# Patient Record
Sex: MALE | Race: WHITE | NOT HISPANIC OR LATINO
[De-identification: names, ages, dates, MRNs, and addresses within clinical notes are randomized per-mention and may not be internally consistent; named-entity substitution may affect disease eponyms.]

---

## 2023-03-23 ENCOUNTER — P2P PATIENT RECORD (OUTPATIENT)
Age: 68
End: 2023-03-23

## 2023-03-28 ENCOUNTER — OFFICE VISIT (OUTPATIENT)
Dept: URBAN - METROPOLITAN AREA TELEHEALTH 2 | Facility: TELEHEALTH | Age: 68
End: 2023-03-28
Payer: MEDICARE

## 2023-03-28 ENCOUNTER — OFFICE VISIT (OUTPATIENT)
Dept: URBAN - METROPOLITAN AREA TELEHEALTH 2 | Facility: TELEHEALTH | Age: 68
End: 2023-03-28

## 2023-03-28 DIAGNOSIS — K76.9 LIVER LESION: ICD-10-CM

## 2023-03-28 DIAGNOSIS — B18.2 CHRONIC HEPATITIS C WITHOUT HEPATIC COMA: ICD-10-CM

## 2023-03-28 PROBLEM — 300331000: Status: ACTIVE | Noted: 2023-03-28

## 2023-03-28 PROBLEM — 416200006: Status: ACTIVE | Noted: 2023-03-28

## 2023-03-28 PROBLEM — 453861000124107: Status: ACTIVE | Noted: 2023-03-28

## 2023-03-28 PROBLEM — 128302006: Status: ACTIVE | Noted: 2023-03-28

## 2023-03-28 PROCEDURE — 99204 OFFICE O/P NEW MOD 45 MIN: CPT | Performed by: PHYSICIAN ASSISTANT

## 2023-03-28 RX ORDER — AMLODIPINE BESYLATE 10 MG/1
1 TABLET TABLET ORAL ONCE A DAY
Status: ACTIVE | COMMUNITY

## 2023-03-28 RX ORDER — TAMSULOSIN HYDROCHLORIDE 0.4 MG/1
1 CAPSULE CAPSULE ORAL ONCE A DAY
Status: ACTIVE | COMMUNITY

## 2023-03-28 RX ORDER — OMEPRAZOLE 40 MG/1
1 CAPSULE 30 MINUTES BEFORE MORNING MEAL CAPSULE, DELAYED RELEASE ORAL ONCE A DAY
Status: ACTIVE | COMMUNITY

## 2023-03-28 NOTE — HPI-TODAY'S VISIT:
This is a 67 year old male referred by Dr. Atul Tavera for evaluation of liver lesion and hepatitis c.   3/28/23 Only some records sent to us and patient was on the way to another doctors visit that he forgot about therefore phone call was used.  PatientIs listed it is taking amlodipine 10 mg, diclofenac 1% topical gel, omeprazole 40 mg, tamsulosin 0.4 mg.  He is listed as having chronic hepatitis C, hypertension, atrial fibrillation, hiatal hernia, BPH, history of Tb. He notes hx of illicit drug use. Denies blood tranfusion. Some etoh use. Discussed meds and cautioned on diclofenac. ALso discussed the omeprazole and would need to lower that dose  MRI dated March 6, 2023 Liver without fat or iron.  Heterogeneous lacelike enhancement of the liver noted on most series and suggests fibrosis.  Lesion #1 on segment 5 with arterial phase enhancement they thought was LR 3.  They also saw scattered peripheral foci of arterial hypervascularity noted and they thought these might represent perfusion abnormalities.  Gallbladder and biliary tree normal.  The kidneys with severe hydronephrosis and hydroureter involving the left kidney.  Gastrointestinal large hiatal hernia.  Spleen normal.  Pancreas normal.  Overall they thought they saw changes of chronic liver disease and an LR 3 lesion. He is seeing a urologist to take a look at the kidney issues.  Hepatitis C genotype is listed as 1 day and there is a drug resistance assay that is pending.  This was done through [facility].  He had ultrasound done in 2021 due to chronic hep C it showed the liver had normal echogenicity and there were no lesions in the portal vein was patent and the hepatic veins are patent.  Discussed the labs and he has had knowledge of this for a while but did not want to do the older treatments.

## 2023-05-12 ENCOUNTER — OFFICE VISIT (OUTPATIENT)
Dept: URBAN - METROPOLITAN AREA CLINIC 86 | Facility: CLINIC | Age: 68
End: 2023-05-12

## 2023-05-23 ENCOUNTER — WEB ENCOUNTER (OUTPATIENT)
Dept: URBAN - METROPOLITAN AREA CLINIC 42 | Facility: CLINIC | Age: 68
End: 2023-05-23

## 2023-05-26 ENCOUNTER — WEB ENCOUNTER (OUTPATIENT)
Dept: URBAN - METROPOLITAN AREA CLINIC 42 | Facility: CLINIC | Age: 68
End: 2023-05-26

## 2023-05-26 ENCOUNTER — OFFICE VISIT (OUTPATIENT)
Dept: URBAN - METROPOLITAN AREA CLINIC 42 | Facility: CLINIC | Age: 68
End: 2023-05-26
Payer: MEDICARE

## 2023-05-26 VITALS
BODY MASS INDEX: 25.62 KG/M2 | HEART RATE: 84 BPM | SYSTOLIC BLOOD PRESSURE: 131 MMHG | TEMPERATURE: 98.4 F | RESPIRATION RATE: 20 BRPM | WEIGHT: 183 LBS | HEIGHT: 71 IN | DIASTOLIC BLOOD PRESSURE: 81 MMHG

## 2023-05-26 DIAGNOSIS — K74.60 CIRRHOSIS OF LIVER WITHOUT ASCITES, UNSPECIFIED HEPATIC CIRRHOSIS TYPE: ICD-10-CM

## 2023-05-26 DIAGNOSIS — R10.32 LLQ PAIN: ICD-10-CM

## 2023-05-26 DIAGNOSIS — B18.2 CHRONIC HEPATITIS C WITHOUT HEPATIC COMA: ICD-10-CM

## 2023-05-26 DIAGNOSIS — K76.9 LIVER LESION: ICD-10-CM

## 2023-05-26 PROBLEM — 19943007: Status: ACTIVE | Noted: 2023-05-26

## 2023-05-26 PROCEDURE — 99214 OFFICE O/P EST MOD 30 MIN: CPT | Performed by: INTERNAL MEDICINE

## 2023-05-26 RX ORDER — AMLODIPINE BESYLATE 10 MG/1
1 TABLET TABLET ORAL ONCE A DAY
Status: ACTIVE | COMMUNITY

## 2023-05-26 RX ORDER — OMEPRAZOLE 40 MG/1
1 CAPSULE 30 MINUTES BEFORE MORNING MEAL CAPSULE, DELAYED RELEASE ORAL ONCE A DAY
Status: ACTIVE | COMMUNITY

## 2023-05-26 RX ORDER — DICYCLOMINE HYDROCHLORIDE 20 MG/1
1 TABLET TABLET ORAL
Qty: 120 | Refills: 3 | OUTPATIENT
Start: 2023-05-26 | End: 2023-09-23

## 2023-05-26 RX ORDER — TAMSULOSIN HYDROCHLORIDE 0.4 MG/1
1 CAPSULE CAPSULE ORAL ONCE A DAY
Status: ACTIVE | COMMUNITY

## 2023-05-26 NOTE — HPI-TODAY'S VISIT:
The patient is a 67 yo with chronic hepatitis C cirrhosis who is here for a follow-up.  Does have chronic hepatitis C and has never been on treatment.  Recent MRI showed LR3 liver lesion.  Has not had any follow-up with that now is following up with me for the first time.  He also complains of some achy abdominal pain that is in the LLQ that radiates to the R and epigastric area.  No real changes in bowel habits except some mild decrease frequently.  does take a shake with a lot of greens.

## 2023-05-26 NOTE — PHYSICAL EXAM EYES:
Conjunctivae and eyelids appear normal,  Sclerae : White without injection  Form of the steroid changed from foam to solution.  Saloni Turcios, CHANDLER, CNP, FNP-BC  [facility]

## 2023-06-05 ENCOUNTER — LAB OUTSIDE AN ENCOUNTER (OUTPATIENT)
Dept: URBAN - METROPOLITAN AREA TELEHEALTH 2 | Facility: TELEHEALTH | Age: 68
End: 2023-06-05

## 2023-06-23 ENCOUNTER — WEB ENCOUNTER (OUTPATIENT)
Dept: URBAN - METROPOLITAN AREA CLINIC 42 | Facility: CLINIC | Age: 68
End: 2023-06-23

## 2023-07-26 ENCOUNTER — OFFICE VISIT (OUTPATIENT)
Dept: URBAN - METROPOLITAN AREA CLINIC 42 | Facility: CLINIC | Age: 68
End: 2023-07-26
Payer: MEDICARE

## 2023-07-26 VITALS
SYSTOLIC BLOOD PRESSURE: 144 MMHG | HEIGHT: 71 IN | DIASTOLIC BLOOD PRESSURE: 75 MMHG | WEIGHT: 182 LBS | RESPIRATION RATE: 20 BRPM | BODY MASS INDEX: 25.48 KG/M2 | TEMPERATURE: 98 F | HEART RATE: 91 BPM

## 2023-07-26 DIAGNOSIS — K76.9 LIVER LESION: ICD-10-CM

## 2023-07-26 DIAGNOSIS — R10.32 LLQ PAIN: ICD-10-CM

## 2023-07-26 DIAGNOSIS — K74.60 CIRRHOSIS OF LIVER WITHOUT ASCITES, UNSPECIFIED HEPATIC CIRRHOSIS TYPE: ICD-10-CM

## 2023-07-26 DIAGNOSIS — B18.2 CHRONIC HEPATITIS C WITHOUT HEPATIC COMA: ICD-10-CM

## 2023-07-26 PROCEDURE — 99214 OFFICE O/P EST MOD 30 MIN: CPT | Performed by: INTERNAL MEDICINE

## 2023-07-26 RX ORDER — AMLODIPINE BESYLATE 10 MG/1
1 TABLET TABLET ORAL ONCE A DAY
Status: ACTIVE | COMMUNITY

## 2023-07-26 RX ORDER — DICYCLOMINE HYDROCHLORIDE 20 MG/1
1 TABLET TABLET ORAL
Qty: 120 | Refills: 3 | OUTPATIENT

## 2023-07-26 RX ORDER — OMEPRAZOLE 40 MG/1
1 CAPSULE 30 MINUTES BEFORE MORNING MEAL CAPSULE, DELAYED RELEASE ORAL ONCE A DAY
Status: ACTIVE | COMMUNITY

## 2023-07-26 RX ORDER — TAMSULOSIN HYDROCHLORIDE 0.4 MG/1
1 CAPSULE CAPSULE ORAL ONCE A DAY
Status: ACTIVE | COMMUNITY

## 2023-07-26 RX ORDER — DICYCLOMINE HYDROCHLORIDE 20 MG/1
1 TABLET TABLET ORAL
Qty: 120 | Refills: 3 | Status: ACTIVE | COMMUNITY
Start: 2023-05-26 | End: 2023-09-23

## 2023-07-26 NOTE — PHYSICAL EXAM CONSTITUTIONAL:
well developed, well nourished , in no acute distress , ambulating without difficulty , normal communication ability
[place]

## 2023-07-26 NOTE — HPI-TODAY'S VISIT:
The patient is a 67 yo with chronic hepatitis C cirrhosis who is here for a follow-up.  Does have chronic hepatitis C and has never been on treatment.  Recent MRI showed LR3 liver lesion.  Has not had any follow-up with that now is following up with me for the first time.  He also complains of some achy abdominal pain that is in the LLQ that radiates to the R and epigastric area.  No real changes in bowel habits except some mild decrease frequently.  does take a shake with a lot of greens.  7/26/2023 Since last visit, has been seen by Dr. Mariposa Kaur and planned for repeat MRI for evaluation of the liver lesion and no HCV treatment was recommended.  melida continues to feel fine.

## 2023-07-28 ENCOUNTER — WEB ENCOUNTER (OUTPATIENT)
Dept: URBAN - METROPOLITAN AREA CLINIC 42 | Facility: CLINIC | Age: 68
End: 2023-07-28

## 2023-08-07 ENCOUNTER — WEB ENCOUNTER (OUTPATIENT)
Dept: URBAN - METROPOLITAN AREA CLINIC 42 | Facility: CLINIC | Age: 68
End: 2023-08-07

## 2023-08-09 ENCOUNTER — TELEPHONE ENCOUNTER (OUTPATIENT)
Dept: URBAN - METROPOLITAN AREA CLINIC 23 | Facility: CLINIC | Age: 68
End: 2023-08-09

## 2023-09-05 ENCOUNTER — WEB ENCOUNTER (OUTPATIENT)
Dept: URBAN - METROPOLITAN AREA CLINIC 42 | Facility: CLINIC | Age: 68
End: 2023-09-05

## 2023-09-27 ENCOUNTER — OFFICE VISIT (OUTPATIENT)
Dept: URBAN - METROPOLITAN AREA CLINIC 42 | Facility: CLINIC | Age: 68
End: 2023-09-27

## 2023-11-03 ENCOUNTER — OFFICE VISIT (OUTPATIENT)
Dept: URBAN - METROPOLITAN AREA CLINIC 42 | Facility: CLINIC | Age: 68
End: 2023-11-03
Payer: MEDICARE

## 2023-11-03 VITALS
HEART RATE: 84 BPM | BODY MASS INDEX: 25.62 KG/M2 | SYSTOLIC BLOOD PRESSURE: 127 MMHG | TEMPERATURE: 97.7 F | DIASTOLIC BLOOD PRESSURE: 86 MMHG | RESPIRATION RATE: 20 BRPM | HEIGHT: 71 IN | WEIGHT: 183 LBS

## 2023-11-03 DIAGNOSIS — B18.2 CHRONIC HEPATITIS C WITHOUT HEPATIC COMA: ICD-10-CM

## 2023-11-03 DIAGNOSIS — K76.9 LIVER LESION: ICD-10-CM

## 2023-11-03 DIAGNOSIS — K74.60 CIRRHOSIS OF LIVER WITHOUT ASCITES, UNSPECIFIED HEPATIC CIRRHOSIS TYPE: ICD-10-CM

## 2023-11-03 PROCEDURE — 99214 OFFICE O/P EST MOD 30 MIN: CPT | Performed by: INTERNAL MEDICINE

## 2023-11-03 RX ORDER — VELPATASVIR AND SOFOSBUVIR 100; 400 MG/1; MG/1
1 TABLET TABLET, FILM COATED ORAL ONCE A DAY
Qty: 84 TABLET | Refills: 0 | OUTPATIENT
Start: 2023-11-03 | End: 2024-01-25

## 2023-11-03 RX ORDER — OMEPRAZOLE 40 MG/1
1 CAPSULE 30 MINUTES BEFORE MORNING MEAL CAPSULE, DELAYED RELEASE ORAL AS NEEDED
Status: ACTIVE | COMMUNITY

## 2023-11-03 RX ORDER — DICYCLOMINE HYDROCHLORIDE 20 MG/1
1 TABLET TABLET ORAL
Qty: 120 | Refills: 3 | Status: ACTIVE | COMMUNITY

## 2023-11-03 RX ORDER — AMLODIPINE BESYLATE 10 MG/1
1 TABLET TABLET ORAL ONCE A DAY
Status: ACTIVE | COMMUNITY

## 2023-11-03 NOTE — HPI-TODAY'S VISIT:
The patient is a 67 yo with chronic hepatitis C cirrhosis who is here for a follow-up.  Does have chronic hepatitis C and has never been on treatment.  Recent MRI showed LR3 liver lesion.  Has not had any follow-up with that now is following up with me for the first time.  He also complains of some achy abdominal pain that is in the LLQ that radiates to the R and epigastric area.  No real changes in bowel habits except some mild decrease frequently.  does take a shake with a lot of greens.  7/26/2023 Since last visit, has been seen by Dr. Mariposa Kaur and planned for repeat MRI for evaluation of the liver lesion and no HCV treatment was recommended.  melida continues to feel fine.  11/03/29 Since last visit, has not been seen by angie again.  I arranged for MRI done at [facility] and it did not show any definitive lesion of the liver.  Currently have no complaints.

## 2023-11-06 ENCOUNTER — WEB ENCOUNTER (OUTPATIENT)
Dept: URBAN - METROPOLITAN AREA CLINIC 42 | Facility: CLINIC | Age: 68
End: 2023-11-06

## 2023-11-08 ENCOUNTER — WEB ENCOUNTER (OUTPATIENT)
Dept: URBAN - METROPOLITAN AREA CLINIC 42 | Facility: CLINIC | Age: 68
End: 2023-11-08

## 2023-11-09 ENCOUNTER — WEB ENCOUNTER (OUTPATIENT)
Dept: URBAN - METROPOLITAN AREA CLINIC 42 | Facility: CLINIC | Age: 68
End: 2023-11-09

## 2023-11-15 ENCOUNTER — WEB ENCOUNTER (OUTPATIENT)
Dept: URBAN - METROPOLITAN AREA CLINIC 42 | Facility: CLINIC | Age: 68
End: 2023-11-15

## 2023-12-01 ENCOUNTER — WEB ENCOUNTER (OUTPATIENT)
Dept: URBAN - METROPOLITAN AREA CLINIC 42 | Facility: CLINIC | Age: 68
End: 2023-12-01

## 2023-12-15 ENCOUNTER — OFFICE VISIT (OUTPATIENT)
Dept: URBAN - METROPOLITAN AREA CLINIC 42 | Facility: CLINIC | Age: 68
End: 2023-12-15
Payer: MEDICARE

## 2023-12-15 VITALS
HEART RATE: 93 BPM | TEMPERATURE: 97.5 F | HEIGHT: 71 IN | SYSTOLIC BLOOD PRESSURE: 138 MMHG | BODY MASS INDEX: 24.92 KG/M2 | RESPIRATION RATE: 20 BRPM | DIASTOLIC BLOOD PRESSURE: 87 MMHG | WEIGHT: 178 LBS

## 2023-12-15 DIAGNOSIS — K76.9 LIVER LESION: ICD-10-CM

## 2023-12-15 DIAGNOSIS — K74.60 CIRRHOSIS OF LIVER WITHOUT ASCITES, UNSPECIFIED HEPATIC CIRRHOSIS TYPE: ICD-10-CM

## 2023-12-15 DIAGNOSIS — B18.2 CHRONIC HEPATITIS C WITHOUT HEPATIC COMA: ICD-10-CM

## 2023-12-15 PROCEDURE — 99213 OFFICE O/P EST LOW 20 MIN: CPT | Performed by: INTERNAL MEDICINE

## 2023-12-15 RX ORDER — OMEPRAZOLE 40 MG/1
1 CAPSULE 30 MINUTES BEFORE MORNING MEAL CAPSULE, DELAYED RELEASE ORAL AS NEEDED
Status: ACTIVE | COMMUNITY

## 2023-12-15 RX ORDER — VELPATASVIR AND SOFOSBUVIR 100; 400 MG/1; MG/1
1 TABLET TABLET, FILM COATED ORAL ONCE A DAY
Qty: 84 TABLET | Refills: 0 | Status: ACTIVE | COMMUNITY
Start: 2023-11-03 | End: 2024-01-25

## 2023-12-15 RX ORDER — AMLODIPINE BESYLATE 10 MG/1
1 TABLET TABLET ORAL ONCE A DAY
Status: ACTIVE | COMMUNITY

## 2023-12-15 NOTE — HPI-TODAY'S VISIT:
The patient is a 67 yo with chronic hepatitis C cirrhosis who is here for a follow-up.  Does have chronic hepatitis C and has never been on treatment.  Recent MRI showed LR3 liver lesion.  Has not had any follow-up with that now is following up with me for the first time.  He also complains of some achy abdominal pain that is in the LLQ that radiates to the R and epigastric area.  No real changes in bowel habits except some mild decrease frequently.  does take a shake with a lot of greens.  7/26/2023 Since last visit, has been seen by Dr. Mariposa Kaur and planned for repeat MRI for evaluation of the liver lesion and no HCV treatment was recommended.  melida continues to feel fine.  11/03/29 Since last visit, has not been seen by angie again.  I arranged for MRI done at [facility] and it did not show any definitive lesion of the liver.  Currently have no complaints.  12/15/23 Since the last visit, has started epclusa and has been on it for 1 month now.

## 2023-12-18 LAB
A/G RATIO: 1.4
ALBUMIN: 4.6
ALKALINE PHOSPHATASE: 85
ALT (SGPT): 15
AST (SGOT): 23
BILIRUBIN, TOTAL: 0.3
BUN/CREATININE RATIO: 12
BUN: 17
CALCIUM: 10.3
CARBON DIOXIDE, TOTAL: 23
CHLORIDE: 98
CREATININE: 1.47
EGFR: 52
GLOBULIN, TOTAL: 3.3
GLUCOSE: 135
HCV LOG10: (no result)
HEPATITIS C QUANTITATION: (no result)
POTASSIUM: 4.5
PROTEIN, TOTAL: 7.9
SODIUM: 139
TEST INFORMATION:: (no result)

## 2023-12-19 ENCOUNTER — WEB ENCOUNTER (OUTPATIENT)
Dept: URBAN - METROPOLITAN AREA CLINIC 42 | Facility: CLINIC | Age: 68
End: 2023-12-19

## 2023-12-20 ENCOUNTER — TELEPHONE ENCOUNTER (OUTPATIENT)
Dept: URBAN - METROPOLITAN AREA CLINIC 42 | Facility: CLINIC | Age: 68
End: 2023-12-20

## 2023-12-22 ENCOUNTER — TELEPHONE ENCOUNTER (OUTPATIENT)
Dept: URBAN - METROPOLITAN AREA CLINIC 42 | Facility: CLINIC | Age: 68
End: 2023-12-22

## 2023-12-24 ENCOUNTER — WEB ENCOUNTER (OUTPATIENT)
Dept: URBAN - METROPOLITAN AREA CLINIC 42 | Facility: CLINIC | Age: 68
End: 2023-12-24

## 2023-12-24 RX ORDER — ONDANSETRON HYDROCHLORIDE 8 MG/1
1 TABLET AS NEEDED TABLET, FILM COATED ORAL
Qty: 90 | Refills: 3 | OUTPATIENT
Start: 2024-01-02

## 2023-12-30 ENCOUNTER — WEB ENCOUNTER (OUTPATIENT)
Dept: URBAN - METROPOLITAN AREA CLINIC 42 | Facility: CLINIC | Age: 68
End: 2023-12-30

## 2024-01-01 ENCOUNTER — WEB ENCOUNTER (OUTPATIENT)
Dept: URBAN - METROPOLITAN AREA CLINIC 42 | Facility: CLINIC | Age: 69
End: 2024-01-01

## 2024-01-02 ENCOUNTER — WEB ENCOUNTER (OUTPATIENT)
Dept: URBAN - METROPOLITAN AREA CLINIC 42 | Facility: CLINIC | Age: 69
End: 2024-01-02

## 2024-01-03 ENCOUNTER — WEB ENCOUNTER (OUTPATIENT)
Dept: URBAN - METROPOLITAN AREA CLINIC 42 | Facility: CLINIC | Age: 69
End: 2024-01-03

## 2024-01-08 ENCOUNTER — LAB OUTSIDE AN ENCOUNTER (OUTPATIENT)
Dept: URBAN - METROPOLITAN AREA CLINIC 42 | Facility: CLINIC | Age: 69
End: 2024-01-08

## 2024-01-12 ENCOUNTER — WEB ENCOUNTER (OUTPATIENT)
Dept: URBAN - METROPOLITAN AREA CLINIC 42 | Facility: CLINIC | Age: 69
End: 2024-01-12

## 2024-01-12 ENCOUNTER — TELEPHONE ENCOUNTER (OUTPATIENT)
Dept: URBAN - METROPOLITAN AREA CLINIC 42 | Facility: CLINIC | Age: 69
End: 2024-01-12

## 2024-01-12 LAB
A/G RATIO: 1.4
ADENOVIRUS F 40/41: NOT DETECTED
AFP, SERUM, TUMOR MARKER: 10.8
ALBUMIN: 3.9
ALKALINE PHOSPHATASE: 86
ALT (SGPT): 13
AST (SGOT): 17
BILIRUBIN, TOTAL: 0.5
BUN/CREATININE RATIO: 11
BUN: 15
CALCIUM: 9.3
CALPROTECTIN, STOOL - QDX: (no result)
CAMPYLOBACTER: NOT DETECTED
CARBON DIOXIDE, TOTAL: 23
CHLORIDE: 101
CLOSTRIDIUM DIFFICILE: NOT DETECTED
CREATININE: 1.41
EGFR: 54
ENTAMOEBA HISTOLYTICA: NOT DETECTED
ENTEROAGGREGATIVE E.COLI: NOT DETECTED
ENTEROTOXIGENIC E.COLI: NOT DETECTED
ESCHERICHIA COLI O157: NOT DETECTED
GIARDIA LAMBLIA: NOT DETECTED
GLOBULIN, TOTAL: 2.7
GLUCOSE: 97
HEMATOCRIT: 41.8
HEMOGLOBIN: 14.1
MCH: 31.6
MCHC: 33.7
MCV: 94
NOROVIRUS GI/GII: NOT DETECTED
NRBC: (no result)
PANCREATICELASTASE ELISA, STOOL: (no result)
PLATELETS: 268
POTASSIUM: 4.3
PROTEIN, TOTAL: 6.6
RBC: 4.46
RDW: 12.5
ROTAVIRUS A: NOT DETECTED
SALMONELLA SPP.: NOT DETECTED
SHIGA-LIKE TOXIN PRODUCING E.COLI: NOT DETECTED
SHIGELLA SPP. / ENTEROINVASIVE E.COLI: NOT DETECTED
SODIUM: 139
VIBRIO PARAHAEMOLYTICUS: NOT DETECTED
VIBRIO SPP.: NOT DETECTED
WBC: 8
YERSINIA ENTEROCOLITICA: NOT DETECTED

## 2024-01-12 RX ORDER — PANCRELIPASE 36000; 180000; 114000 [USP'U]/1; [USP'U]/1; [USP'U]/1
2 CAPSULES WITH MEALS AND 1 WITH SNACKS CAPSULE, DELAYED RELEASE PELLETS ORAL
Qty: 240 | Refills: 5 | OUTPATIENT
Start: 2024-01-12 | End: 2024-07-10

## 2024-01-14 ENCOUNTER — WEB ENCOUNTER (OUTPATIENT)
Dept: URBAN - METROPOLITAN AREA CLINIC 42 | Facility: CLINIC | Age: 69
End: 2024-01-14

## 2024-01-17 ENCOUNTER — WEB ENCOUNTER (OUTPATIENT)
Dept: URBAN - METROPOLITAN AREA CLINIC 42 | Facility: CLINIC | Age: 69
End: 2024-01-17

## 2024-01-17 RX ORDER — PANCRELIPASE 36000; 180000; 114000 [USP'U]/1; [USP'U]/1; [USP'U]/1
2 CAPSULES WITH MEALS AND 1 WITH SNACKS CAPSULE, DELAYED RELEASE PELLETS ORAL
Qty: 240 | Refills: 5
Start: 2024-01-12 | End: 2024-07-17

## 2024-01-19 ENCOUNTER — WEB ENCOUNTER (OUTPATIENT)
Dept: URBAN - METROPOLITAN AREA CLINIC 42 | Facility: CLINIC | Age: 69
End: 2024-01-19

## 2024-01-21 ENCOUNTER — WEB ENCOUNTER (OUTPATIENT)
Dept: URBAN - METROPOLITAN AREA CLINIC 42 | Facility: CLINIC | Age: 69
End: 2024-01-21

## 2024-01-22 ENCOUNTER — WEB ENCOUNTER (OUTPATIENT)
Dept: URBAN - METROPOLITAN AREA CLINIC 42 | Facility: CLINIC | Age: 69
End: 2024-01-22

## 2024-01-30 ENCOUNTER — TELEPHONE ENCOUNTER (OUTPATIENT)
Dept: URBAN - METROPOLITAN AREA CLINIC 42 | Facility: CLINIC | Age: 69
End: 2024-01-30

## 2024-01-30 ENCOUNTER — WEB ENCOUNTER (OUTPATIENT)
Dept: URBAN - METROPOLITAN AREA CLINIC 42 | Facility: CLINIC | Age: 69
End: 2024-01-30

## 2024-01-30 ENCOUNTER — LAB OUTSIDE AN ENCOUNTER (OUTPATIENT)
Dept: URBAN - METROPOLITAN AREA CLINIC 42 | Facility: CLINIC | Age: 69
End: 2024-01-30

## 2024-02-02 ENCOUNTER — OV EP (OUTPATIENT)
Dept: URBAN - METROPOLITAN AREA CLINIC 42 | Facility: CLINIC | Age: 69
End: 2024-02-02
Payer: MEDICARE

## 2024-02-02 VITALS
HEIGHT: 71 IN | HEART RATE: 89 BPM | TEMPERATURE: 97.1 F | WEIGHT: 157 LBS | SYSTOLIC BLOOD PRESSURE: 136 MMHG | RESPIRATION RATE: 20 BRPM | DIASTOLIC BLOOD PRESSURE: 88 MMHG | BODY MASS INDEX: 21.98 KG/M2

## 2024-02-02 DIAGNOSIS — B18.2 CHRONIC HEPATITIS C WITHOUT HEPATIC COMA: ICD-10-CM

## 2024-02-02 DIAGNOSIS — R19.7 DIARRHEA, UNSPECIFIED TYPE: ICD-10-CM

## 2024-02-02 DIAGNOSIS — R63.4 WEIGHT LOSS, UNINTENTIONAL: ICD-10-CM

## 2024-02-02 DIAGNOSIS — R11.2 NAUSEA AND VOMITING, UNSPECIFIED VOMITING TYPE: ICD-10-CM

## 2024-02-02 PROCEDURE — 99214 OFFICE O/P EST MOD 30 MIN: CPT | Performed by: INTERNAL MEDICINE

## 2024-02-02 RX ORDER — ONDANSETRON HYDROCHLORIDE 8 MG/1
1 TABLET AS NEEDED TABLET, FILM COATED ORAL
Qty: 90 | Refills: 3 | Status: ACTIVE | COMMUNITY
Start: 2024-01-02

## 2024-02-02 RX ORDER — PANCRELIPASE 36000; 180000; 114000 [USP'U]/1; [USP'U]/1; [USP'U]/1
2 CAPSULES WITH MEALS AND 1 WITH SNACKS CAPSULE, DELAYED RELEASE PELLETS ORAL
Qty: 240 | Refills: 5 | Status: ACTIVE | COMMUNITY
Start: 2024-01-12 | End: 2024-07-17

## 2024-02-02 RX ORDER — AMLODIPINE BESYLATE 10 MG/1
1 TABLET TABLET ORAL ONCE A DAY
Status: ACTIVE | COMMUNITY

## 2024-02-02 RX ORDER — OMEPRAZOLE 40 MG/1
1 CAPSULE 30 MINUTES BEFORE MORNING MEAL CAPSULE, DELAYED RELEASE ORAL AS NEEDED
Status: ACTIVE | COMMUNITY

## 2024-02-02 NOTE — HPI-TODAY'S VISIT:
The patient is a 67 yo with chronic hepatitis C cirrhosis who is here for a follow-up.  Does have chronic hepatitis C and has never been on treatment.  Recent MRI showed LR3 liver lesion.  Has not had any follow-up with that now is following up with me for the first time.  He also complains of some achy abdominal pain that is in the LLQ that radiates to the R and epigastric area.  No real changes in bowel habits except some mild decrease frequently.  does take a shake with a lot of greens.  7/26/2023 Since last visit, has been seen by Dr. Mariposa Kaur and planned for repeat MRI for evaluation of the liver lesion and no HCV treatment was recommended.  melida continues to feel fine.  11/03/29 Since last visit, has not been seen by angie again.  I arranged for MRI done at [facility] and it did not show any definitive lesion of the liver.  Currently have no complaints.  12/15/23 Since the last visit, has started epclusa and has been on it for 1 month now.  2/2/24 The patient has had severe diarrhea for 2 months now with significant weight loss and diffuse abdominal pain.  Has had stools tudies that was negative except for elevated calprotectin.  Tried him on Creon but the patient felt that creon is causing abdominal pain.

## 2024-02-09 LAB
A/G RATIO: 1.3
AFP, SERUM, TUMOR MARKER: 13.8
ALBUMIN: 4
ALKALINE PHOSPHATASE: 90
ALT (SGPT): 10
AST (SGOT): 18
BILIRUBIN, TOTAL: 0.3
BUN/CREATININE RATIO: 9
BUN: 17
CALCIUM: 9.8
CARBON DIOXIDE, TOTAL: 18
CHLORIDE: 100
CREATININE: 1.86
EGFR: 39
GLOBULIN, TOTAL: 3.2
GLUCOSE: 101
HCV LOG10: (no result)
HEPATITIS C QUANTITATION: (no result)
POTASSIUM: 3.7
PROTEIN, TOTAL: 7.2
SODIUM: 136
TEST INFORMATION:: (no result)

## 2024-02-13 ENCOUNTER — LAB (OUTPATIENT)
Dept: URBAN - METROPOLITAN AREA MEDICAL CENTER 26 | Facility: MEDICAL CENTER | Age: 69
End: 2024-02-13
Payer: MEDICARE

## 2024-02-13 DIAGNOSIS — E83.42 HYPOMAGNESEMIA: ICD-10-CM

## 2024-02-13 DIAGNOSIS — R19.7 ACUTE DIARRHEA: ICD-10-CM

## 2024-02-13 DIAGNOSIS — B18.2 CARRIER OF VIRAL HEPATITIS C: ICD-10-CM

## 2024-02-13 PROCEDURE — G8427 DOCREV CUR MEDS BY ELIG CLIN: HCPCS | Performed by: PHYSICIAN ASSISTANT

## 2024-02-13 PROCEDURE — 99222 1ST HOSP IP/OBS MODERATE 55: CPT | Performed by: PHYSICIAN ASSISTANT

## 2024-02-13 PROCEDURE — 99254 IP/OBS CNSLTJ NEW/EST MOD 60: CPT | Performed by: PHYSICIAN ASSISTANT

## 2024-02-14 ENCOUNTER — LAB (OUTPATIENT)
Dept: URBAN - METROPOLITAN AREA MEDICAL CENTER 26 | Facility: MEDICAL CENTER | Age: 69
End: 2024-02-14
Payer: MEDICARE

## 2024-02-14 DIAGNOSIS — R11.2 ACUTE NAUSEA WITH NONBILIOUS VOMITING: ICD-10-CM

## 2024-02-14 DIAGNOSIS — K29.60 ADENOPAPILLOMATOSIS GASTRICA: ICD-10-CM

## 2024-02-14 DIAGNOSIS — K52.89 (LYMPHOCYTIC) MICROSCOPIC COLITIS: ICD-10-CM

## 2024-02-14 DIAGNOSIS — K29.80 ACUTE DUODENITIS: ICD-10-CM

## 2024-02-14 DIAGNOSIS — R19.7 ACUTE DIARRHEA: ICD-10-CM

## 2024-02-14 PROCEDURE — 45380 COLONOSCOPY AND BIOPSY: CPT | Performed by: STUDENT IN AN ORGANIZED HEALTH CARE EDUCATION/TRAINING PROGRAM

## 2024-02-14 PROCEDURE — 43235 EGD DIAGNOSTIC BRUSH WASH: CPT | Performed by: STUDENT IN AN ORGANIZED HEALTH CARE EDUCATION/TRAINING PROGRAM

## 2024-02-14 PROCEDURE — 43239 EGD BIOPSY SINGLE/MULTIPLE: CPT | Performed by: STUDENT IN AN ORGANIZED HEALTH CARE EDUCATION/TRAINING PROGRAM

## 2024-02-16 ENCOUNTER — OV EP (OUTPATIENT)
Dept: URBAN - METROPOLITAN AREA CLINIC 42 | Facility: CLINIC | Age: 69
End: 2024-02-16

## 2024-02-21 ENCOUNTER — TELEP (OUTPATIENT)
Dept: URBAN - METROPOLITAN AREA TELEHEALTH 2 | Facility: TELEHEALTH | Age: 69
End: 2024-02-21
Payer: MEDICARE

## 2024-02-21 ENCOUNTER — OV EP (OUTPATIENT)
Dept: URBAN - METROPOLITAN AREA CLINIC 42 | Facility: CLINIC | Age: 69
End: 2024-02-21

## 2024-02-21 VITALS — BODY MASS INDEX: 21.98 KG/M2 | HEIGHT: 71 IN | WEIGHT: 157 LBS

## 2024-02-21 DIAGNOSIS — K52.89 (LYMPHOCYTIC) MICROSCOPIC COLITIS: ICD-10-CM

## 2024-02-21 DIAGNOSIS — B18.2 CHRONIC HEPATITIS C WITHOUT HEPATIC COMA: ICD-10-CM

## 2024-02-21 PROCEDURE — 99214 OFFICE O/P EST MOD 30 MIN: CPT | Performed by: INTERNAL MEDICINE

## 2024-02-21 RX ORDER — ONDANSETRON HYDROCHLORIDE 8 MG/1
1 TABLET AS NEEDED TABLET, FILM COATED ORAL
Qty: 90 | Refills: 3 | Status: ACTIVE | COMMUNITY
Start: 2024-01-02

## 2024-02-21 RX ORDER — AMLODIPINE BESYLATE 10 MG/1
1 TABLET TABLET ORAL ONCE A DAY
Status: ACTIVE | COMMUNITY

## 2024-02-21 RX ORDER — PANCRELIPASE 36000; 180000; 114000 [USP'U]/1; [USP'U]/1; [USP'U]/1
2 CAPSULES WITH MEALS AND 1 WITH SNACKS CAPSULE, DELAYED RELEASE PELLETS ORAL
Qty: 240 | Refills: 5 | Status: ACTIVE | COMMUNITY
Start: 2024-01-12 | End: 2024-07-17

## 2024-02-21 RX ORDER — OMEPRAZOLE 40 MG/1
1 CAPSULE 30 MINUTES BEFORE MORNING MEAL CAPSULE, DELAYED RELEASE ORAL AS NEEDED
Status: ACTIVE | COMMUNITY

## 2024-02-21 NOTE — HPI-TODAY'S VISIT:
The patient is following up today.  Recently discharged from the hospital.  Had EGD/colonoscopy for nausea/colitis and had nonspecific acute colitis on biopsy.  All other biopsies negative.  The patient had severe diarrhea on discharge and was put on budesonide.  He said that he actually  had constipation requiring OTC softener.

## 2024-02-27 ENCOUNTER — COL/EGD (OUTPATIENT)
Dept: URBAN - METROPOLITAN AREA SURGERY CENTER 13 | Facility: SURGERY CENTER | Age: 69
End: 2024-02-27

## 2024-03-15 ENCOUNTER — OV EP (OUTPATIENT)
Dept: URBAN - METROPOLITAN AREA CLINIC 42 | Facility: CLINIC | Age: 69
End: 2024-03-15
Payer: MEDICARE

## 2024-03-15 VITALS
TEMPERATURE: 97.5 F | DIASTOLIC BLOOD PRESSURE: 85 MMHG | WEIGHT: 156 LBS | RESPIRATION RATE: 20 BRPM | SYSTOLIC BLOOD PRESSURE: 141 MMHG | HEIGHT: 71 IN | BODY MASS INDEX: 21.84 KG/M2 | HEART RATE: 79 BPM

## 2024-03-15 DIAGNOSIS — K52.89 (LYMPHOCYTIC) MICROSCOPIC COLITIS: ICD-10-CM

## 2024-03-15 DIAGNOSIS — K76.9 LIVER LESION: ICD-10-CM

## 2024-03-15 DIAGNOSIS — B18.2 CHRONIC HEPATITIS C WITHOUT HEPATIC COMA: ICD-10-CM

## 2024-03-15 PROCEDURE — 99214 OFFICE O/P EST MOD 30 MIN: CPT | Performed by: INTERNAL MEDICINE

## 2024-03-15 RX ORDER — AMLODIPINE BESYLATE 10 MG/1
1 TABLET TABLET ORAL ONCE A DAY
Status: ACTIVE | COMMUNITY

## 2024-03-15 NOTE — HPI-TODAY'S VISIT:
The patient is following up today.  Recently discharged from the hospital.  Had EGD/colonoscopy for nausea/colitis and had nonspecific acute colitis on biopsy.  All other biopsies negative.  The patient had severe diarrhea on discharge and was put on budesonide.  He said that he actually  had constipation requiring OTC softener.  3/15/23 Diarrhea has resolved completely.  BM close to normal now.  Put on 3-4 lbs since last time.

## 2024-03-15 NOTE — PHYSICAL EXAM HENT:
Head, normocephalic, atraumatic, Face, Face within normal limits, Ears, External ears within normal limits, Nose/Nasopharynx, External nose normal appearance, nares patent, no nasal discharge, Mouth and Throat, Oral cavity appearance normal, Lips, Appearance normal Dorsal Nasal Flap Text: The defect edges were debeveled with a #15 scalpel blade.  Given the location of the defect and the proximity to free margins a dorsal nasal flap was deemed most appropriate.  Using a sterile surgical marker, an appropriate dorsal nasal flap was drawn around the defect.    The area thus outlined was incised deep to adipose tissue with a #15 scalpel blade.  The skin margins were undermined to an appropriate distance in all directions utilizing iris scissors.

## 2024-05-05 ENCOUNTER — WEB ENCOUNTER (OUTPATIENT)
Dept: URBAN - METROPOLITAN AREA CLINIC 42 | Facility: CLINIC | Age: 69
End: 2024-05-05

## 2024-05-06 ENCOUNTER — WEB ENCOUNTER (OUTPATIENT)
Dept: URBAN - METROPOLITAN AREA CLINIC 42 | Facility: CLINIC | Age: 69
End: 2024-05-06

## 2024-05-17 ENCOUNTER — DASHBOARD ENCOUNTERS (OUTPATIENT)
Age: 69
End: 2024-05-17

## 2024-05-17 ENCOUNTER — OFFICE VISIT (OUTPATIENT)
Dept: URBAN - METROPOLITAN AREA CLINIC 42 | Facility: CLINIC | Age: 69
End: 2024-05-17
Payer: MEDICARE

## 2024-05-17 VITALS
DIASTOLIC BLOOD PRESSURE: 83 MMHG | SYSTOLIC BLOOD PRESSURE: 136 MMHG | HEART RATE: 91 BPM | WEIGHT: 160 LBS | HEIGHT: 71 IN | TEMPERATURE: 97.5 F | BODY MASS INDEX: 22.4 KG/M2 | RESPIRATION RATE: 20 BRPM

## 2024-05-17 DIAGNOSIS — K74.60 CIRRHOSIS OF LIVER WITHOUT ASCITES, UNSPECIFIED HEPATIC CIRRHOSIS TYPE: ICD-10-CM

## 2024-05-17 DIAGNOSIS — K76.9 LIVER LESION: ICD-10-CM

## 2024-05-17 DIAGNOSIS — B18.2 CHRONIC HEPATITIS C WITHOUT HEPATIC COMA: ICD-10-CM

## 2024-05-17 PROCEDURE — 99214 OFFICE O/P EST MOD 30 MIN: CPT | Performed by: INTERNAL MEDICINE

## 2024-05-17 RX ORDER — AMLODIPINE BESYLATE 10 MG/1
1 TABLET TABLET ORAL ONCE A DAY
Status: ACTIVE | COMMUNITY

## 2024-07-05 ENCOUNTER — WEB ENCOUNTER (OUTPATIENT)
Dept: URBAN - METROPOLITAN AREA CLINIC 42 | Facility: CLINIC | Age: 69
End: 2024-07-05

## 2024-08-05 ENCOUNTER — WEB ENCOUNTER (OUTPATIENT)
Dept: URBAN - METROPOLITAN AREA CLINIC 42 | Facility: CLINIC | Age: 69
End: 2024-08-05

## 2024-08-07 ENCOUNTER — WEB ENCOUNTER (OUTPATIENT)
Dept: URBAN - METROPOLITAN AREA CLINIC 42 | Facility: CLINIC | Age: 69
End: 2024-08-07

## 2024-11-01 ENCOUNTER — WEB ENCOUNTER (OUTPATIENT)
Dept: URBAN - METROPOLITAN AREA CLINIC 42 | Facility: CLINIC | Age: 69
End: 2024-11-01

## 2024-11-01 RX ORDER — OMEPRAZOLE 40 MG/1
1 CAPSULE 30 MINUTES BEFORE MORNING MEAL CAPSULE, DELAYED RELEASE ORAL AS NEEDED
Qty: 30 | Refills: 11

## 2024-11-15 ENCOUNTER — OFFICE VISIT (OUTPATIENT)
Dept: URBAN - METROPOLITAN AREA CLINIC 42 | Facility: CLINIC | Age: 69
End: 2024-11-15
Payer: MEDICARE

## 2024-11-15 VITALS
SYSTOLIC BLOOD PRESSURE: 127 MMHG | BODY MASS INDEX: 23.8 KG/M2 | RESPIRATION RATE: 20 BRPM | WEIGHT: 170 LBS | TEMPERATURE: 97.7 F | HEIGHT: 71 IN | HEART RATE: 83 BPM | DIASTOLIC BLOOD PRESSURE: 86 MMHG

## 2024-11-15 DIAGNOSIS — K76.9 LIVER LESION: ICD-10-CM

## 2024-11-15 DIAGNOSIS — K74.60 CIRRHOSIS OF LIVER WITHOUT ASCITES, UNSPECIFIED HEPATIC CIRRHOSIS TYPE: ICD-10-CM

## 2024-11-15 DIAGNOSIS — B18.2 CHRONIC HEPATITIS C WITHOUT HEPATIC COMA: ICD-10-CM

## 2024-11-15 PROCEDURE — 99214 OFFICE O/P EST MOD 30 MIN: CPT | Performed by: INTERNAL MEDICINE

## 2024-11-15 RX ORDER — OMEPRAZOLE 40 MG/1
1 CAPSULE 30 MINUTES BEFORE MORNING MEAL CAPSULE, DELAYED RELEASE ORAL AS NEEDED
Qty: 30 | Refills: 11 | Status: ACTIVE | COMMUNITY

## 2024-11-15 RX ORDER — AMLODIPINE BESYLATE 10 MG/1
1 TABLET TABLET ORAL ONCE A DAY
Status: ACTIVE | COMMUNITY

## 2024-11-15 NOTE — PHYSICAL EXAM CHEST:
no lesions, no deformities, no traumatic injuries, no significant scars are present, chest wall non-tender, no masses present, breathing is unlabored without accessory muscle use,normal breath sounds
Olivier Doss

## 2024-11-15 NOTE — HPI-TODAY'S VISIT:
The patient is following up today.  Recently discharged from the hospital.  Had EGD/colonoscopy for nausea/colitis and had nonspecific acute colitis on biopsy.  All other biopsies negative.  The patient had severe diarrhea on discharge and was put on budesonide.  He said that he actually  had constipation requiring OTC softener.  3/15/23 Diarrhea has resolved completely.  BM close to normal now.  Put on 3-4 lbs since last time.  5/17/24 Feeling well today.  Diarrhea resolved and now down to 1 budesonide/day. recent MRI did not show evidence of HCC following up with Dr. Kaur at [place] for tumor surveillance and is pending fibroscan, Oncoguard, and repeat MRI in 6 months.  11/15/24 Currently feeling well.   The patient is doing well.  Only takes PPI on a rare basis.  No further taking budesonide.

## 2024-11-27 ENCOUNTER — WEB ENCOUNTER (OUTPATIENT)
Dept: URBAN - METROPOLITAN AREA CLINIC 42 | Facility: CLINIC | Age: 69
End: 2024-11-27

## 2024-12-03 ENCOUNTER — WEB ENCOUNTER (OUTPATIENT)
Dept: URBAN - METROPOLITAN AREA CLINIC 42 | Facility: CLINIC | Age: 69
End: 2024-12-03

## 2024-12-03 RX ORDER — OMEPRAZOLE 40 MG/1
1 CAPSULE 30 MINUTES BEFORE MORNING MEAL CAPSULE, DELAYED RELEASE ORAL TWICE DAILY
Qty: 60 | Refills: 11

## 2024-12-06 ENCOUNTER — OFFICE VISIT (OUTPATIENT)
Dept: URBAN - METROPOLITAN AREA CLINIC 42 | Facility: CLINIC | Age: 69
End: 2024-12-06

## 2024-12-06 RX ORDER — AMLODIPINE BESYLATE 10 MG/1
1 TABLET TABLET ORAL ONCE A DAY
Status: ACTIVE | COMMUNITY

## 2024-12-06 RX ORDER — OMEPRAZOLE 40 MG/1
1 CAPSULE 30 MINUTES BEFORE MORNING MEAL CAPSULE, DELAYED RELEASE ORAL AS NEEDED
Qty: 30 | Refills: 11 | Status: ACTIVE | COMMUNITY

## 2025-05-16 ENCOUNTER — OFFICE VISIT (OUTPATIENT)
Dept: URBAN - METROPOLITAN AREA CLINIC 42 | Facility: CLINIC | Age: 70
End: 2025-05-16
Payer: MEDICARE

## 2025-05-16 DIAGNOSIS — K74.60 CIRRHOSIS OF LIVER WITHOUT ASCITES, UNSPECIFIED HEPATIC CIRRHOSIS TYPE: ICD-10-CM

## 2025-05-16 DIAGNOSIS — K76.89 LIVER LESION: ICD-10-CM

## 2025-05-16 DIAGNOSIS — B18.2 CHRONIC HEPATITIS C WITHOUT HEPATIC COMA: ICD-10-CM

## 2025-05-16 PROCEDURE — 99214 OFFICE O/P EST MOD 30 MIN: CPT | Performed by: INTERNAL MEDICINE

## 2025-05-16 RX ORDER — AMLODIPINE BESYLATE 10 MG/1
1 TABLET TABLET ORAL ONCE A DAY
Status: ACTIVE | COMMUNITY

## 2025-05-16 RX ORDER — OMEPRAZOLE 40 MG/1
1 CAPSULE 30 MINUTES BEFORE MORNING MEAL CAPSULE, DELAYED RELEASE ORAL AS NEEDED
Qty: 30 | Refills: 11 | Status: ON HOLD | COMMUNITY

## 2025-05-16 NOTE — HPI-TODAY'S VISIT:
The patient is following up today.  Recently discharged from the hospital.  Had EGD/colonoscopy for nausea/colitis and had nonspecific acute colitis on biopsy.  All other biopsies negative.  The patient had severe diarrhea on discharge and was put on budesonide.  He said that he actually  had constipation requiring OTC softener.  3/15/23 Diarrhea has resolved completely.  BM close to normal now.  Put on 3-4 lbs since last time.  5/17/24 Feeling well today.  Diarrhea resolved and now down to 1 budesonide/day. recent MRI did not show evidence of HCC following up with Dr. Kaur at [place] for tumor surveillance and is pending fibroscan, Oncoguard, and repeat MRI in 6 months.  11/15/24 Currently feeling well.   The patient is doing well.  Only takes PPI on a rare basis.  No further taking budesonide.  5/16/25 Currently feeling well.  The patient is doing well.  Only takes PPI on a rare basis.  No diarrhea.

## 2025-05-22 LAB
% FREE PSA: 43.3
ALBUMIN: 4.4
ALKALINE PHOSPHATASE: 85
ALT (SGPT): 16
AST (SGOT): 23
BILIRUBIN, TOTAL: 0.4
BUN/CREATININE RATIO: 13
BUN: 18
CALCIUM: 10.5
CARBON DIOXIDE, TOTAL: 24
CHLORIDE: 100
CREATININE: 1.42
EGFR: 53
GLOBULIN, TOTAL: 2.7
GLUCOSE: 136
HCV LOG10: (no result)
HEMATOCRIT: 48
HEMOGLOBIN: 16.2
HEPATITIS C QUANTITATION: (no result)
INR: 1
MCH: 32
MCHC: 33.8
MCV: 95
NRBC: (no result)
PLATELETS: 226
POTASSIUM: 4.6
PROSTATE SPECIFIC AG, SERUM: 0.9
PROTEIN, TOTAL: 7.1
PROTHROMBIN TIME: 11.3
PSA, FREE: 0.39
RBC: 5.06
RDW: 12.8
SODIUM: 140
TEST INFORMATION:: (no result)
WBC: 5.2

## 2025-05-23 ENCOUNTER — TELEPHONE ENCOUNTER (OUTPATIENT)
Dept: URBAN - METROPOLITAN AREA CLINIC 42 | Facility: CLINIC | Age: 70
End: 2025-05-23

## 2025-06-20 ENCOUNTER — TELEPHONE ENCOUNTER (OUTPATIENT)
Dept: URBAN - METROPOLITAN AREA CLINIC 42 | Facility: CLINIC | Age: 70
End: 2025-06-20